# Patient Record
Sex: MALE | Race: WHITE | NOT HISPANIC OR LATINO | Employment: FULL TIME | URBAN - METROPOLITAN AREA
[De-identification: names, ages, dates, MRNs, and addresses within clinical notes are randomized per-mention and may not be internally consistent; named-entity substitution may affect disease eponyms.]

---

## 2022-05-19 ENCOUNTER — OFFICE VISIT (OUTPATIENT)
Dept: FAMILY MEDICINE | Facility: CLINIC | Age: 27
End: 2022-05-19
Payer: COMMERCIAL

## 2022-05-19 VITALS
SYSTOLIC BLOOD PRESSURE: 138 MMHG | RESPIRATION RATE: 16 BRPM | HEART RATE: 94 BPM | BODY MASS INDEX: 27.2 KG/M2 | OXYGEN SATURATION: 98 % | WEIGHT: 223.4 LBS | DIASTOLIC BLOOD PRESSURE: 82 MMHG | HEIGHT: 76 IN | TEMPERATURE: 98.8 F

## 2022-05-19 DIAGNOSIS — B07.8 COMMON WART: ICD-10-CM

## 2022-05-19 DIAGNOSIS — L08.89 PITTED KERATOLYSIS: Primary | ICD-10-CM

## 2022-05-19 PROCEDURE — 99203 OFFICE O/P NEW LOW 30 MIN: CPT | Mod: 25 | Performed by: NURSE PRACTITIONER

## 2022-05-19 PROCEDURE — 17110 DESTRUCTION B9 LES UP TO 14: CPT | Performed by: NURSE PRACTITIONER

## 2022-05-19 RX ORDER — CLINDAMYCIN PHOSPHATE 10 MG/G
GEL TOPICAL 2 TIMES DAILY
Qty: 60 G | Refills: 2 | Status: SHIPPED | OUTPATIENT
Start: 2022-05-19 | End: 2022-05-29

## 2022-05-19 NOTE — PROGRESS NOTES
"  Assessment & Plan     Pitted keratolysis    - clindamycin (CLINDAMAX) 1 % external gel; Apply topically 2 times daily for 10 days    Common wart    - DESTRUCT BENIGN LESION, UP TO 14             Tobacco Cessation:   reports that he has been smoking. He has never used smokeless tobacco.  Tobacco Cessation Action Plan: Information offered: Patient not interested at this time    BMI:   Estimated body mass index is 27.19 kg/m  as calculated from the following:    Height as of this encounter: 1.93 m (6' 4\").    Weight as of this encounter: 101.3 kg (223 lb 6.4 oz).   Weight management plan: Discussed healthy diet and exercise guidelines    Follow up in 2-4 weeks for persistent symptoms, sooner for new or worsening symptoms.     See Patient Instructions    No follow-ups on file.    KD Parker CNP  [facility] is a 26 year old who presents for the following health issues     History of Present Illness       Reason for visit:  Establishing new care. Checkup. Pitted keratolysis.    He eats 2-3 servings of fruits and vegetables daily.He consumes 0 sweetened beverage(s) daily.He exercises with enough effort to increase his heart rate 30 to 60 minutes per day.  He exercises with enough effort to increase his heart rate 5 days per week.   He is taking medications regularly.       Warts  Onset/Duration: 10 years   Description (location/number): 3   Accompanying signs and symptoms (pain, redness):  no   History: prior warts: YES  Therapies tried and outcome: liquid nitrogen and OTC meds          Concern - derm problem  Onset: noticed it in the last couple months   Description: Patient has pain most of the time on the bottom of his feet. When they get wet he notices some holes under his feet   Intensity: mild  Progression of Symptoms:  same  Accompanying Signs & Symptoms: none   Previous history of similar problem: none  Precipitating factors:        Worsened by: " "wet   Alleviating factors:        Improved by: none   Therapies tried and outcome: lotion, salicylic acid      Review of Systems   Constitutional, HEENT, cardiovascular, pulmonary, gi and gu systems are negative, except as otherwise noted.      Objective    /82   Pulse 94   Temp 98.8  F (37.1  C) (Tympanic)   Resp 16   Ht 1.93 m (6' 4\")   Wt 101.3 kg (223 lb 6.4 oz)   SpO2 98%   BMI 27.19 kg/m    Body mass index is 27.19 kg/m .  Physical Exam   GENERAL: healthy, alert and no distress  MS: no gross musculoskeletal defects noted, no edema  SKIN: small annular peeling spots in arch of feet. No edema, erythema, drainage, lesions. Common warts to right arm/hand- 6 on arm and 1 on hand.  NEURO: Normal strength and tone, mentation intact and speech normal    Discussed etiology and natural course of warts as well as risk and benefit of treatment of cutaneous warts and patient desires treatment.  Treated with liquid nitrogen with 3 freeze/thaw/freeze cycles.  Paring was not done prior to liquid nitrogen.  Patient tolerated procedure well.              "

## 2022-05-19 NOTE — PATIENT INSTRUCTIONS
There are multiple studies that have used Vitamin A and Zinc to successfully cure warts in 1-2 months.     Zinc alone:   Adults and children over 65 lb: 15 mg three times daily  Children less then 65 lb: 5 mg three times daily    May combine with Vitamin A:  Recommended dose is 10,000 international unit(s) of Vitamin A twice daily    If the warts are still there in 2-3 months time then I would stop the vitamins as it is not likely to work.     Recommend treating with salicylic acid preparation daily after blistering resolved.  Continue salicylic acid treatment for 2-4 weeks with filing of skin between treatments.  Return if wart not resolved.

## 2022-07-24 ENCOUNTER — HEALTH MAINTENANCE LETTER (OUTPATIENT)
Age: 27
End: 2022-07-24

## 2022-10-03 ENCOUNTER — HEALTH MAINTENANCE LETTER (OUTPATIENT)
Age: 27
End: 2022-10-03

## 2023-08-13 ENCOUNTER — HEALTH MAINTENANCE LETTER (OUTPATIENT)
Age: 28
End: 2023-08-13

## 2024-10-06 ENCOUNTER — HEALTH MAINTENANCE LETTER (OUTPATIENT)
Age: 29
End: 2024-10-06